# Patient Record
Sex: FEMALE | Race: WHITE
[De-identification: names, ages, dates, MRNs, and addresses within clinical notes are randomized per-mention and may not be internally consistent; named-entity substitution may affect disease eponyms.]

---

## 2017-10-06 ENCOUNTER — HOSPITAL ENCOUNTER (OUTPATIENT)
Dept: HOSPITAL 80 - FIMAGING | Age: 42
End: 2017-10-06
Attending: OBSTETRICS & GYNECOLOGY
Payer: COMMERCIAL

## 2017-10-06 DIAGNOSIS — Z12.31: Primary | ICD-10-CM

## 2017-10-06 PROCEDURE — G0202 SCR MAMMO BI INCL CAD: HCPCS

## 2019-02-17 ENCOUNTER — HOSPITAL ENCOUNTER (EMERGENCY)
Dept: HOSPITAL 80 - FED | Age: 44
Discharge: HOME | End: 2019-02-17
Payer: COMMERCIAL

## 2019-02-17 VITALS — DIASTOLIC BLOOD PRESSURE: 120 MMHG | SYSTOLIC BLOOD PRESSURE: 154 MMHG
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
## 2019-02-17 DIAGNOSIS — Z23: ICD-10-CM

## 2019-02-17 DIAGNOSIS — S61.411A: Primary | ICD-10-CM
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
## 2019-02-17 PROCEDURE — 0HQFXZZ REPAIR RIGHT HAND SKIN, EXTERNAL APPROACH: ICD-10-PCS

## 2019-02-17 NOTE — EDPHY
General


Time Seen by Provider: 02/17/19 18:35


Narrative: 





CLINICAL IMPRESSION: 





Right hand laceration


________________________


ASSESSMENT/PLAN: 





Patient is a 43-year-old female with no significant medical history who 

presents for evaluation of right hand laceration sustained just prior to 

arrival. Patient is not toxic appearing, she is in no distress. Physical 

examination reveals 2 cm laceration on the palmar aspect of the right hand, 1st 

webspace. There is no evidence of deep structure involvement, neurovascular 

compromise, foreign body, or bony involvement. The wound was not contaminated, 

tetanus status was updated today. The wound was irrigated and then repaired as 

discussed in the procedure note, the patient tolerated this well. Wound care 

instructions discussed with patient and family member.  She is well established 

with her PCP in [place], they will call to schedule an appointment for wound 

check and suture removal in approximately 7-10 days. Return precautions 

discussed- she will return for increased pain, signs of infection, fever, 

vomiting, if the wound opens or for any other concerns. Patient and family 

member both verbalize understanding and are in agreement with plan.


___________________________


DIFFERENTIAL DIAGNOSIS: 


 includes but not limited to laceration of tendon or vascular structure, 

underlying fracture, laceration with retained FB 


__________________


ED PROCEDURES: 


Laceration Repair 


Verbal consent obtained by patient. Risks discussed, including but not limited 

to infection, pain, retained foreign body, need for additional repair, poor 

cosmetic result, tendon damage, nerve damage, poor wound healing, vascular 

damage. Alternatives to repair discussed. 


Universal protocol used to establish correct patient, procedure, equipment, 

support staff, and site. Anesthesia obtained by local infiltration. 

Anesthetized with 1% lidocaine with epinephrine. Laceration location right hand

, palmar aspect, 1st webspace, length 2 cm, depth 3 mm, Repair type simple. 


Patient was prepped and draped in usual sterile fashion. Hemostasis achieved 

with direct pressure. Wound explored through full range of motion and entire 

depth of wound probed and visualized with gloved finger. No suspicion for nerve 

damage, tendon damage, underlying fracture, vascular damage, foreign body, or 

contamination. Area was cleansed with Shur-Clens and irrigated with sterile 

saline as per protocol. No foreign body or material removed. 


Repair method 5.0 Prolene, interrupted.  Five sutures placed. Well aligned, 

closely approximated. wound was dressed with antibiotic ointment and sterile 

dressing. Patient tolerated well with no immediate complications. 


Wound care: Clean and dry x 24 hours, gently clean with soap and water, cover 

with topical antibiotic ointment/bandage. 


Suture/Staple removal:  7-10 Days 


____________________


CHIEF COMPLAINT:  Laceration  


____________________


HPI: 





Patient is a 43-year-old female who presents to the emergency department with 

complaints of right hand laceration sustained just prior to arrival.  Patient 

reports that she slipped, she grabbed onto a wheelbarrow on her way down 

causing her to lacerate her right hand.  She did not hit her head, there was no 

loss of consciousness.  She has pain at the site of the laceration, she was 

able to get the bleeding under control.  She denies any numbness or tingling of 

the digit.  She denies any other injury or complaint.  She is right handed, she 

is not up-to-date on her tetanus status.


_________________________


PAST MEDICAL HISTORY:  Denies 


Pertinent Past Surgical History:  Denies 


Social History:  Occasional alcohol, denies illicit drug use or smoking 


_____________________


REVIEW OF SYSTEMS: 


All other systems negative 


Constitutional: No fever, no chills 


Musculoskeletal: No deformity, no joint pain 


Skin:    Laceration, denies rash.


Neurological: No sensory loss or weakness. 


_________________


PHYSICAL EXAM: 


General Appearance: Alert, oriented, appropriate for age, cooperative, NAD, 

well hydrated, non-toxic appearing, VSS, no hypoxia. 


Neurological:  Alert and oriented x 3 


Skin: 2 cm laceration on the palmar aspect of the right hand, 1st webspace.





Musculoskeletal:  


Upper Extremities: Intact distal pulses, Full range of motion intact, no 

ecchymosis or edema.  Right hand with 2 cm lacerations 1st webspace, palmar 

aspect.  Two point discrimination is intact to all digits.  Full range of 

motion and strength, each interphalangeal joint tested independently.


Lower Extremities: Intact distal pulses, No edema, No tenderness, No cyanosis, 

full range of motion intact, No calf tenderness bilaterally.





The radial, ulnar and median nerves were all tested.


Radial nerve: Patient is able to extend wrist and fingers of the local joints.


Ulnar nerve: Patient is able to abduct all fingers.


Median nerve patient is able to oppose thumb to pinky.


_____________________________


MEDICAL DECISION MAKING: 


Patient was seen independently. Secondary supervising physician at time of 

evaluation was Dr. Hanson, he did not evaluate this patient. 


Diagnosis:  Right hand laceration. New, requires workup 


Summary: See assessment and plan for summary of ED visit  


Clinical lab tests:  Not applicable. 


Independent visualization of images, tracing, or specimens not applicable. 


Decision to obtain medical records or history from someone other than the 

patient:  No 


Review / Summarize previous medical records:  No 


Disposition:  Stable, discharged home





- History


Smoking Status: Never smoked





- Objective


Vital Signs: 


 Initial Vital Signs











Temperature (C)  36.7 C   02/17/19 18:16


 


Heart Rate  86   02/17/19 18:16


 


Respiratory Rate  18   02/17/19 18:16


 


Blood Pressure  137/93 H  02/17/19 18:16


 


O2 Sat (%)  96   02/17/19 18:16








 











O2 Delivery Mode               Room Air














Allergies/Adverse Reactions: 


 





Penicillins Allergy (Verified 02/17/19 18:19)


 








Home Medications: 














 Medication  Instructions  Recorded


 


NK [No Known Home Meds]  02/17/19











Medications Given: 


 








Discontinued Medications





Diphtheria/Tetanus/Acell Pertussis (Boostrix)  0.5 ml IM .ONCE ONE


   Stop: 02/17/19 18:37


   Last Admin: 02/17/19 18:42 Dose:  0.5 ml








Departure





- Departure


Disposition: Home, Routine, Self-Care


Clinical Impression: 


Hand laceration


Qualifiers:


 Encounter type: initial encounter Foreign body presence: without foreign body 

Laterality: right Qualified Code(s): S61.411A - Laceration without foreign body 

of right hand, initial encounter





Condition: Good


Instructions:  Laceration (ED)


Additional Instructions: 


DISCHARGE INSTRUCTIONS FROM YOUR DOCTOR 


Thank you for visiting our emergency department today. Please keep in mind that 

discharge from the emergency department does not mean that there is nothing 

wrong - it simply means that we have not identified an emergency condition that 

requires further evaluation or treatment in the hospital. You should always 

plan to follow up with primary care for re-evaluation of your condition in the 

next 2-3 days. 





Keep wound clean and dry for 24 hours. Then remove dressing, clean at least 

twice daily or when soiled with soap and water, apply antibiotic ointment and 

dressing.





Do not soak the wound while the stitches are in place. 





Elevate hand as much as possible for the next 24 hours to decrease the swelling 

and pain.





Anticipate suture removal in 7-10 days.





For pain control:


You may take Tylenol, I recommend 500-1000 mg every 6-8 hours as needed.  Take 

with food and a full glass of water.  Stop taking if this is upsetting her 

stomach.  Do not exceed 4000 mg in a 24 hr period.





You may also take ibuprofen, recommend 400 mg every 6 hr.  Take with food and a 

full glass of water.  Stop taking if this upsets her stomach.  Do not exceed 

2400 mg in a 24 hr period.





Schedule a follow-up visit with your primary care physician for suture removal 

in 7-10 days and sooner for wound check for any concerns. 





As discussed the laceration was not deep enough to visualize the tendon today. 

It is unlikely a tendon injury is present and your tendon function is currently 

intact. However, if at any time, you feel a pop and have difficulty bending or 

straightening the finger, you should seek re-evaluation from a hand specialist 

urgently. 





Return for signs of wound infection ie: redness, swelling, drainage, foul odor, 

red streaks, fever, chills, pain, bleeding, if the stitches pop, if the wound 

opens, for numbness, tingling, weakness, discoloration of the finger, coolness 

of the finger, inability to move or bend the finger or for any other new, 

worsening or worrisome symptoms.  





People present with illnesses and injuries in different ways, and it is always 

possible that we have missed something. You may always return for re-evaluation 

if symptoms worsen or if they are not improving or if you develop new/different 

symptoms. 





Again, thank you for choosing our emergency department. We hope that you feel 

better.


Referrals: 


NONE *PRIMARY CARE P,. [Primary Care Provider] - As per Instructions


Daniela Rueda MD [Medical Doctor] - As per Instructions (Please establish 

care if you do not have a primary care provider.)